# Patient Record
Sex: FEMALE | Race: OTHER | NOT HISPANIC OR LATINO
[De-identification: names, ages, dates, MRNs, and addresses within clinical notes are randomized per-mention and may not be internally consistent; named-entity substitution may affect disease eponyms.]

---

## 2022-11-17 PROBLEM — Z00.00 ENCOUNTER FOR PREVENTIVE HEALTH EXAMINATION: Status: ACTIVE | Noted: 2022-11-17

## 2022-11-18 PROBLEM — R92.2 DENSE BREAST: Status: ACTIVE | Noted: 2022-11-18

## 2022-11-22 ENCOUNTER — APPOINTMENT (OUTPATIENT)
Dept: SURGICAL ONCOLOGY | Facility: CLINIC | Age: 47
End: 2022-11-22

## 2022-11-22 VITALS
WEIGHT: 160 LBS | OXYGEN SATURATION: 98 % | RESPIRATION RATE: 18 BRPM | HEIGHT: 62.6 IN | SYSTOLIC BLOOD PRESSURE: 117 MMHG | BODY MASS INDEX: 28.71 KG/M2 | HEART RATE: 93 BPM | TEMPERATURE: 98 F | DIASTOLIC BLOOD PRESSURE: 80 MMHG

## 2022-11-22 DIAGNOSIS — Z78.9 OTHER SPECIFIED HEALTH STATUS: ICD-10-CM

## 2022-11-22 DIAGNOSIS — E78.5 HYPERLIPIDEMIA, UNSPECIFIED: ICD-10-CM

## 2022-11-22 DIAGNOSIS — I10 ESSENTIAL (PRIMARY) HYPERTENSION: ICD-10-CM

## 2022-11-22 DIAGNOSIS — R92.2 INCONCLUSIVE MAMMOGRAM: ICD-10-CM

## 2022-11-22 PROCEDURE — 99204 OFFICE O/P NEW MOD 45 MIN: CPT

## 2022-11-22 RX ORDER — OMEPRAZOLE 40 MG/1
40 CAPSULE, DELAYED RELEASE ORAL
Qty: 30 | Refills: 0 | Status: ACTIVE | COMMUNITY
Start: 2022-06-09

## 2022-11-22 RX ORDER — ROSUVASTATIN CALCIUM 40 MG/1
40 TABLET, FILM COATED ORAL
Qty: 30 | Refills: 0 | Status: ACTIVE | COMMUNITY
Start: 2022-11-13

## 2022-11-22 RX ORDER — ROSUVASTATIN CALCIUM 20 MG/1
20 TABLET, FILM COATED ORAL
Qty: 30 | Refills: 0 | Status: ACTIVE | COMMUNITY
Start: 2022-09-26

## 2022-11-22 RX ORDER — OLMESARTAN MEDOXOMIL 5 MG/1
5 TABLET, FILM COATED ORAL
Qty: 30 | Refills: 0 | Status: ACTIVE | COMMUNITY
Start: 2022-10-31

## 2022-11-22 NOTE — CONSULT LETTER
[Dear  ___] : Dear  [unfilled], [Consult Letter:] : I had the pleasure of evaluating your patient, [unfilled]. [Please see my note below.] : Please see my note below. [Consult Closing:] : Thank you very much for allowing me to participate in the care of this patient.  If you have any questions, please do not hesitate to contact me. [Sincerely,] : Sincerely, [FreeTextEntry3] : Jeannine Hoyos MD\par Breast Surgeon\par Division of Surgical Oncology\par Department of Surgery\par [address] [place] [address]\[place] [address] \par Tel: [phone]\par Fax: [phone]\par Email: [email or web address]  [DrJil  ___] : Dr. PA

## 2022-11-22 NOTE — PAST MEDICAL HISTORY
[Menstruating] : The patient is menstruating [Menarche Age ____] : age at menarche was [unfilled] [Definite ___ (Date)] : the last menstrual period was [unfilled] [Regular Cycle Intervals] : have been regular [Total Preg ___] : G[unfilled] [Live Births ___] : P[unfilled]  [Age At Live Birth ___] : Age at live birth: [unfilled] [History of Hormone Replacement Treatment] : has no history of hormone replacement treatment [FreeTextEntry6] : none [FreeTextEntry7] : 5 years [FreeTextEntry8] : 7.5 years

## 2022-11-22 NOTE — PHYSICAL EXAM
[Normocephalic] : normocephalic [Atraumatic] : atraumatic [EOMI] : extra ocular movement intact [PERRL] : pupils equal, round and reactive to light [Sclera nonicteric] : sclera nonicteric [Supple] : supple [No Supraclavicular Adenopathy] : no supraclavicular adenopathy [No Cervical Adenopathy] : no cervical adenopathy [No Thyromegaly] : no thyromegaly [Examined in the supine and seated position] : examined in the supine and seated position [Symmetrical] : symmetrical [Bra Size: ___] : Bra Size: [unfilled] [Grade 3] : Ptosis Grade 3 [No dominant masses] : no dominant masses in right breast  [No dominant masses] : no dominant masses left breast [No Nipple Retraction] : no left nipple retraction [No Nipple Discharge] : no left nipple discharge [No Axillary Lymphadenopathy] : no left axillary lymphadenopathy [No Edema] : no edema [No Rashes] : no rashes [No Ulceration] : no ulceration [de-identified] : non-labored respirations

## 2022-11-22 NOTE — HISTORY OF PRESENT ILLNESS
[FreeTextEntry1] : The patient is a 47 year F referred by Dr. Royer Ryan for consultation regarding L IDP, here for initial visit. \par \par She is accompanied by her son-in-law Madie Rogers who assisted with translation to [facility]. Daughter was present via telephone.\par \par The patient is recently immigrated here from [facility] (4/2022) and has not had any prior breast imaging until now. She denies any breast complaints prior to imaging and currently.\par \par Baseline imaging: \par 6/28/2022 B/L SM ([facility]) revealed heterogeneously dense breasts \par - L inner mid depth N6: asymmetry-> L DM and US\par - BR0\par \par They report that the next available appointment for diagnostic imaging was several months later.\par \par 10/17/2022 L DM ([facility])\par - L inner 0.6 x 0.4 cm nodular opacity\par - L axilla several prominent appearing LN visualized which may be reactive 2/2 COVID-19 vaccine administered in the left arm -> L axillary US\par - R neg\par - BR0\par \par 10/17/2022 L US\par - L 12:00 N5 0.6 x 0.5 x 0.9 cm group of cysts\par - L 4:00 N4 0.9 x 0.3 x 0.7 cm cyst\par - L 6:00 N3 0.3 x 0.2 x 0.5 cm hypoechoic nodule consistent w/ mildly complex cyst\par - L 7:00 N2 0.8 x 0.3 x 0.7 cm cyst\par - L 10:00 N5 0.5 x 0.4 x 0.4 cm lobulated hypoechoic complex appearing nodule-> USG-CNB\par - L axilla 0.4 cm LN w/ borderline cortical thickening\par - BR4\par \par 10/31/2022 L USG-CNB ([facility])\par - L 10:00 (R clip): IDP, concordant \par \par The patient has borderline DM, HLD, HTN\par She denies any prior surgeries\par She takes olmesartan, rosuvastatin, iron.\par She denies any family history of breast cancer or other cancers.

## 2022-11-22 NOTE — ASSESSMENT
[FreeTextEntry1] : The patient is a 47 year F referred by Dr. Royer Ryan for consultation regarding L IDP, here for initial visit. \par \par As these are high risk markers, excision is recommended. I discussed with the patient that papillary lesions are difficult for pathologist to read on needle biopsy and thus excision is always recommended, to allow histologic evaluation of the entire lesion. There is approximately 5 to 8% incidence of upgrading of these lesions, most commonly to either atypical hyperplasia or DCIS. If this is not upgraded, this is not a breast cancer risk factor and she should return to routine breast care. \par \par Preoperative, intraoperative, and postoperative considerations including preoperative localization by Radiology of this nonpalpable mass, intraoperative anesthetic management, and what she can expect in postoperative period were reviewed.\par  \par Risks, benefits, and alternatives to proposed surgical procedure were reviewed and all questions were answered to her satisfaction. \par \par Plan:\par - Screening R US now for dense breasts\par - L excisional biopsy w/ Magseed Localization (x1 site)\par - medical clearance needed

## 2022-12-14 ENCOUNTER — OUTPATIENT (OUTPATIENT)
Dept: OUTPATIENT SERVICES | Facility: HOSPITAL | Age: 47
LOS: 1 days | End: 2022-12-14

## 2022-12-14 VITALS
TEMPERATURE: 99 F | DIASTOLIC BLOOD PRESSURE: 80 MMHG | SYSTOLIC BLOOD PRESSURE: 119 MMHG | HEART RATE: 96 BPM | WEIGHT: 158.07 LBS | OXYGEN SATURATION: 97 % | RESPIRATION RATE: 16 BRPM | HEIGHT: 63 IN

## 2022-12-14 DIAGNOSIS — D24.9 BENIGN NEOPLASM OF UNSPECIFIED BREAST: ICD-10-CM

## 2022-12-14 DIAGNOSIS — D36.9 BENIGN NEOPLASM, UNSPECIFIED SITE: ICD-10-CM

## 2022-12-14 DIAGNOSIS — I10 ESSENTIAL (PRIMARY) HYPERTENSION: ICD-10-CM

## 2022-12-14 LAB
ALBUMIN SERPL ELPH-MCNC: 4.5 G/DL — SIGNIFICANT CHANGE UP (ref 3.3–5)
ALP SERPL-CCNC: 96 U/L — SIGNIFICANT CHANGE UP (ref 40–120)
ALT FLD-CCNC: 50 U/L — HIGH (ref 4–33)
ANION GAP SERPL CALC-SCNC: 12 MMOL/L — SIGNIFICANT CHANGE UP (ref 7–14)
AST SERPL-CCNC: 36 U/L — HIGH (ref 4–32)
BILIRUB SERPL-MCNC: 0.3 MG/DL — SIGNIFICANT CHANGE UP (ref 0.2–1.2)
BUN SERPL-MCNC: 6 MG/DL — LOW (ref 7–23)
CALCIUM SERPL-MCNC: 9.6 MG/DL — SIGNIFICANT CHANGE UP (ref 8.4–10.5)
CHLORIDE SERPL-SCNC: 104 MMOL/L — SIGNIFICANT CHANGE UP (ref 98–107)
CO2 SERPL-SCNC: 24 MMOL/L — SIGNIFICANT CHANGE UP (ref 22–31)
CREAT SERPL-MCNC: 0.66 MG/DL — SIGNIFICANT CHANGE UP (ref 0.5–1.3)
EGFR: 109 ML/MIN/1.73M2 — SIGNIFICANT CHANGE UP
GLUCOSE SERPL-MCNC: 123 MG/DL — HIGH (ref 70–99)
HCG SERPL-ACNC: <5 MIU/ML — SIGNIFICANT CHANGE UP
HCT VFR BLD CALC: 39.1 % — SIGNIFICANT CHANGE UP (ref 34.5–45)
HGB BLD-MCNC: 12.8 G/DL — SIGNIFICANT CHANGE UP (ref 11.5–15.5)
MCHC RBC-ENTMCNC: 29.4 PG — SIGNIFICANT CHANGE UP (ref 27–34)
MCHC RBC-ENTMCNC: 32.7 GM/DL — SIGNIFICANT CHANGE UP (ref 32–36)
MCV RBC AUTO: 89.7 FL — SIGNIFICANT CHANGE UP (ref 80–100)
NRBC # BLD: 0 /100 WBCS — SIGNIFICANT CHANGE UP (ref 0–0)
NRBC # FLD: 0 K/UL — SIGNIFICANT CHANGE UP (ref 0–0)
PLATELET # BLD AUTO: 393 K/UL — SIGNIFICANT CHANGE UP (ref 150–400)
POTASSIUM SERPL-MCNC: 3.5 MMOL/L — SIGNIFICANT CHANGE UP (ref 3.5–5.3)
POTASSIUM SERPL-SCNC: 3.5 MMOL/L — SIGNIFICANT CHANGE UP (ref 3.5–5.3)
PROT SERPL-MCNC: 7.6 G/DL — SIGNIFICANT CHANGE UP (ref 6–8.3)
RBC # BLD: 4.36 M/UL — SIGNIFICANT CHANGE UP (ref 3.8–5.2)
RBC # FLD: 12.8 % — SIGNIFICANT CHANGE UP (ref 10.3–14.5)
SODIUM SERPL-SCNC: 140 MMOL/L — SIGNIFICANT CHANGE UP (ref 135–145)
WBC # BLD: 8.48 K/UL — SIGNIFICANT CHANGE UP (ref 3.8–10.5)
WBC # FLD AUTO: 8.48 K/UL — SIGNIFICANT CHANGE UP (ref 3.8–10.5)

## 2022-12-14 PROCEDURE — 93010 ELECTROCARDIOGRAM REPORT: CPT

## 2022-12-14 NOTE — H&P PST ADULT - HISTORY OF PRESENT ILLNESS
Patient is female with preop dx of benign neoplasm unspecified . patient is scheduled for left breast excisional biopsy with magseed localization x 1 site.

## 2022-12-14 NOTE — H&P PST ADULT - PROBLEM SELECTOR PLAN 1
Patient tentatively scheduled for breast excisional biopsy with Magseed localization x 1 site on 12/30/22    Pre-op instructions provided. Pt given verbal and written instructions with teach back on chlorhexidine shampoo and Pepcid. Pt verbalized understanding with return demonstration.    Pt has a scheduled preop COVID test.

## 2022-12-14 NOTE — H&P PST ADULT - NEGATIVE OPHTHALMOLOGIC SYMPTOMS
no diplopia/no photophobia/no lacrimation L/no lacrimation R/no blurred vision L/no blurred vision R/no discharge L/no discharge R/no pain L/no loss of vision L/no loss of vision R/no scleral injection L/no scleral injection R

## 2022-12-14 NOTE — H&P PST ADULT - NSICDXFAMILYHX_GEN_ALL_CORE_FT
FAMILY HISTORY:  Mother  Still living? Yes, Estimated age: Age Unknown  FH: diabetes mellitus, Age at diagnosis: Age Unknown  FH: diabetes mellitus, Age at diagnosis: Age Unknown

## 2022-12-20 PROBLEM — D36.9 BENIGN NEOPLASM, UNSPECIFIED SITE: Chronic | Status: ACTIVE | Noted: 2022-12-14

## 2022-12-20 PROBLEM — E78.5 HYPERLIPIDEMIA, UNSPECIFIED: Chronic | Status: ACTIVE | Noted: 2022-12-14

## 2022-12-20 PROBLEM — I10 ESSENTIAL (PRIMARY) HYPERTENSION: Chronic | Status: ACTIVE | Noted: 2022-12-14

## 2022-12-21 ENCOUNTER — RESULT REVIEW (OUTPATIENT)
Age: 47
End: 2022-12-21

## 2022-12-22 ENCOUNTER — OUTPATIENT (OUTPATIENT)
Dept: OUTPATIENT SERVICES | Facility: HOSPITAL | Age: 47
LOS: 1 days | End: 2022-12-22
Payer: MEDICAID

## 2022-12-22 DIAGNOSIS — C80.1 MALIGNANT (PRIMARY) NEOPLASM, UNSPECIFIED: ICD-10-CM

## 2022-12-22 LAB — SURGICAL PATHOLOGY STUDY: SIGNIFICANT CHANGE UP

## 2022-12-22 PROCEDURE — 88321 CONSLTJ&REPRT SLD PREP ELSWR: CPT

## 2022-12-23 ENCOUNTER — APPOINTMENT (OUTPATIENT)
Dept: ULTRASOUND IMAGING | Facility: IMAGING CENTER | Age: 47
End: 2022-12-23

## 2022-12-23 ENCOUNTER — OUTPATIENT (OUTPATIENT)
Dept: OUTPATIENT SERVICES | Facility: HOSPITAL | Age: 47
LOS: 1 days | End: 2022-12-23
Payer: MEDICAID

## 2022-12-23 ENCOUNTER — RESULT REVIEW (OUTPATIENT)
Age: 47
End: 2022-12-23

## 2022-12-23 DIAGNOSIS — Z00.8 ENCOUNTER FOR OTHER GENERAL EXAMINATION: ICD-10-CM

## 2022-12-23 PROCEDURE — 19285 PERQ DEV BREAST 1ST US IMAG: CPT | Mod: LT

## 2022-12-23 PROCEDURE — 19285 PERQ DEV BREAST 1ST US IMAG: CPT

## 2022-12-29 LAB — SARS-COV-2 RNA SPEC QL NAA+PROBE: DETECTED

## 2022-12-30 ENCOUNTER — APPOINTMENT (OUTPATIENT)
Dept: SURGICAL ONCOLOGY | Facility: AMBULATORY SURGERY CENTER | Age: 47
End: 2022-12-30

## 2022-12-30 ENCOUNTER — APPOINTMENT (OUTPATIENT)
Dept: MAMMOGRAPHY | Facility: IMAGING CENTER | Age: 47
End: 2022-12-30

## 2023-01-12 ENCOUNTER — OUTPATIENT (OUTPATIENT)
Dept: OUTPATIENT SERVICES | Facility: HOSPITAL | Age: 48
LOS: 1 days | End: 2023-01-12

## 2023-01-12 VITALS
OXYGEN SATURATION: 98 % | WEIGHT: 156.09 LBS | DIASTOLIC BLOOD PRESSURE: 74 MMHG | TEMPERATURE: 99 F | RESPIRATION RATE: 16 BRPM | HEIGHT: 62 IN | SYSTOLIC BLOOD PRESSURE: 117 MMHG | HEART RATE: 94 BPM

## 2023-01-12 DIAGNOSIS — D36.9 BENIGN NEOPLASM, UNSPECIFIED SITE: ICD-10-CM

## 2023-01-12 DIAGNOSIS — Z78.9 OTHER SPECIFIED HEALTH STATUS: ICD-10-CM

## 2023-01-12 DIAGNOSIS — Z98.890 OTHER SPECIFIED POSTPROCEDURAL STATES: Chronic | ICD-10-CM

## 2023-01-12 LAB — HCG SERPL-ACNC: <5 MIU/ML — SIGNIFICANT CHANGE UP

## 2023-01-12 NOTE — H&P PST ADULT - HISTORY OF PRESENT ILLNESS
46 y/o F with H/O:   mammo/ US showed something  left breast pain last year 48 y/o F [place] speaking with H/O: HTN, HLD presents to PST with h/o abnormal mammo/ US. Pt c/o left breast pain last year. Schedule for left excisional biopsy with MagSeed localization x1 site.

## 2023-01-12 NOTE — H&P PST ADULT - NSICDXPASTSURGICALHX_GEN_ALL_CORE_FT
PAST SURGICAL HISTORY:  No significant past surgical history      PAST SURGICAL HISTORY:  S/P breast biopsy

## 2023-01-12 NOTE — H&P PST ADULT - PROBLEM SELECTOR PLAN 1
Schedule for left excisional biopsy with Magseed localization x 1 site on 01/27/2023. Pre op instructions given and explained. Pt verbalized understanding.  AS per pt, surgery was scheduled on 12/30/22 but was cancelled due to+ Covid19 PCR on 12/28/22.

## 2023-01-12 NOTE — H&P PST ADULT - NEGATIVE ENMT SYMPTOMS
no hearing difficulty/no ear pain/no tinnitus/no nasal congestion/no nasal obstruction/no nose bleeds/no abnormal taste sensation/no dry mouth/no throat pain

## 2023-01-12 NOTE — H&P PST ADULT - NEGATIVE RESPIRATORY AND THORAX SYMPTOMS
no wheezing/no dyspnea/no hemoptysis/no pleuritic chest pain no wheezing/no dyspnea/no cough/no hemoptysis/no pleuritic chest pain

## 2023-01-12 NOTE — H&P PST ADULT - CARDIOVASCULAR COMMENTS
walk 1 hour daily weather permitted , does own house chores walk 1 hour daily weather permitted, does own house chores

## 2023-01-26 ENCOUNTER — TRANSCRIPTION ENCOUNTER (OUTPATIENT)
Age: 48
End: 2023-01-26

## 2023-01-27 ENCOUNTER — RESULT REVIEW (OUTPATIENT)
Age: 48
End: 2023-01-27

## 2023-01-27 ENCOUNTER — APPOINTMENT (OUTPATIENT)
Dept: MAMMOGRAPHY | Facility: IMAGING CENTER | Age: 48
End: 2023-01-27

## 2023-01-27 ENCOUNTER — OUTPATIENT (OUTPATIENT)
Dept: OUTPATIENT SERVICES | Facility: HOSPITAL | Age: 48
LOS: 1 days | End: 2023-01-27
Payer: COMMERCIAL

## 2023-01-27 ENCOUNTER — OUTPATIENT (OUTPATIENT)
Dept: OUTPATIENT SERVICES | Facility: HOSPITAL | Age: 48
LOS: 1 days | Discharge: HOME | End: 2023-01-27
Payer: MEDICAID

## 2023-01-27 ENCOUNTER — TRANSCRIPTION ENCOUNTER (OUTPATIENT)
Age: 48
End: 2023-01-27

## 2023-01-27 ENCOUNTER — APPOINTMENT (OUTPATIENT)
Dept: SURGICAL ONCOLOGY | Facility: AMBULATORY SURGERY CENTER | Age: 48
End: 2023-01-27

## 2023-01-27 VITALS
HEIGHT: 62 IN | RESPIRATION RATE: 16 BRPM | WEIGHT: 156.09 LBS | SYSTOLIC BLOOD PRESSURE: 122 MMHG | HEART RATE: 80 BPM | DIASTOLIC BLOOD PRESSURE: 70 MMHG | TEMPERATURE: 99 F | OXYGEN SATURATION: 99 %

## 2023-01-27 VITALS
SYSTOLIC BLOOD PRESSURE: 112 MMHG | OXYGEN SATURATION: 99 % | RESPIRATION RATE: 14 BRPM | TEMPERATURE: 98 F | HEART RATE: 68 BPM | DIASTOLIC BLOOD PRESSURE: 62 MMHG

## 2023-01-27 DIAGNOSIS — D36.9 BENIGN NEOPLASM, UNSPECIFIED SITE: ICD-10-CM

## 2023-01-27 DIAGNOSIS — Z98.890 OTHER SPECIFIED POSTPROCEDURAL STATES: Chronic | ICD-10-CM

## 2023-01-27 DIAGNOSIS — Z00.8 ENCOUNTER FOR OTHER GENERAL EXAMINATION: ICD-10-CM

## 2023-01-27 PROCEDURE — 76098 X-RAY EXAM SURGICAL SPECIMEN: CPT | Mod: 26

## 2023-01-27 PROCEDURE — 88307 TISSUE EXAM BY PATHOLOGIST: CPT | Mod: 26

## 2023-01-27 PROCEDURE — 76098 X-RAY EXAM SURGICAL SPECIMEN: CPT

## 2023-01-27 PROCEDURE — 19125 EXCISION BREAST LESION: CPT

## 2023-01-27 RX ORDER — OLMESARTAN MEDOXOMIL 5 MG/1
1 TABLET, FILM COATED ORAL
Qty: 0 | Refills: 0 | DISCHARGE

## 2023-01-27 RX ORDER — ROSUVASTATIN CALCIUM 5 MG/1
1 TABLET ORAL
Qty: 0 | Refills: 0 | DISCHARGE

## 2023-01-27 NOTE — ASU DISCHARGE PLAN (ADULT/PEDIATRIC) - CALL YOUR DOCTOR IF YOU HAVE ANY OF THE FOLLOWING:
Bleeding that does not stop/Swelling that gets worse/Pain not relieved by Medications/Wound/Surgical Site with redness, or foul smelling discharge or pus Bleeding that does not stop/Swelling that gets worse/Pain not relieved by Medications/Fever greater than (need to indicate Fahrenheit or Celsius)/Wound/Surgical Site with redness, or foul smelling discharge or pus/Nausea and vomiting that does not stop

## 2023-01-27 NOTE — ASU DISCHARGE PLAN (ADULT/PEDIATRIC) - NS MD DC FALL RISK RISK
For information on Fall & Injury Prevention, visit: [email or web address] OR  [email or web address] OR  https://www.cdc.gov/steadi/patient.html

## 2023-01-27 NOTE — ASU DISCHARGE PLAN (ADULT/PEDIATRIC) - CARE PROVIDER_API CALL
Jeannine Hoyos)  Surgery  [address] [place] [id number]  Phone: [phone]  Fax: [phone]  Follow Up Time: 2 weeks

## 2023-01-27 NOTE — ASU PREOPERATIVE ASSESSMENT, ADULT (IPARK ONLY) - FALL HARM RISK - PT AGE POPULATION HIDDEN
Adult Niacinamide Pregnancy And Lactation Text: These medications are considered safe during pregnancy.

## 2023-01-27 NOTE — ASU DISCHARGE PLAN (ADULT/PEDIATRIC) - ASU DC SPECIAL INSTRUCTIONSFT
[facility]  CANCER  I  N  S  T  I  T  U  T E     Department of Surgery  Division of Surgical Oncology    Theron Ramirez M.D., EDE.CARLITA.  Chief, Division of Surgical Oncology    Ming Velez M.D., F.A.C.S., F.A.S.JESUS.  Associate , Department of Surgery    Yannick Arriola M.D., F.A.C.S.  Ronak Montiel M.D., F.A.C.S.  Dawood Li M.D., F.A.C.S.  Levon Boucher M.D., F.A.C.S.  Ronak Lott M.D., F.A.C.S.  Arturo Kendall M.D., F.LATOYAC.S.      Breast Biopsy/Lumpectomy Post-operative Instructions  1.	Supportive bra- Please bring a sports/athletic bra with you on the day of your surgery.  You will wear it home from the hospital.  You should wear the supportive bra continuously for 48 hours after the procedure, including wearing it to sleep.  Therefore, you may wear your regular bra.  You may remove the bra to shower.  The sports bra will provide support, decrease the amount of swelling at the biopsy site, and make your recovery more comfortable.    2.	Wound dressing- There is a dressing on the wound, which consists of 2 layers.  The outer layer is a clear plastic dressing (called Tegaderm) which is waterproof.  This should remain in place for 2 days post-operatively.  On the 2nd post-operative day, you should remove the clear plastic Tegaderm dressing.  Underneath the Tegaderm dressing, there are white surgical tapes (called steri strips) which are directly adherent to the skin.  These should remain on the skin, and will peel off naturally.  All of the stitches are “internal” and will dissolve naturally.    3.	Showering/Bathing- You may shower over the dressing the very next day after surgery.  Allow the water to run over the dressing, but don not scrub the area.  It is best not to sit in a bathtub or swimming pool for at least one week after surgery.    4.	Activity level- You may resume most normal daily activity as tolerated, but avoid strenuous activities such as aerobics, jogging, exercising or heavy lifting for at least 1 week after surgery.  You may return to work in 1-2 days after surgery.  You may drive as long as you are not taking any prescription pain medication.    5.	Pain Medication- You may take the prescribed medication, or you may take extra-strength Tylenol as needed.  Please don't take aspirin, Motrin, Advil or any other anti-inflammatory medications, as these medications may cause bleeding or bruising.    6.	Follow-up Appointment- Please call the office to schedule your post-operative appointment which should be approximately 10 days after your surgery. Office [phone]    7.	Bruising/Bleeding/Swelling- It is normal for there to be some bruising and swelling at the breast biopsy site, and there may be some staining of blood on to the dressing.  Some discomfort at the surgical site is expected.  If your symptoms seem excessive, or if you have any question or concerns, please call the office.      Anesthesia Precautions:  For the next 12 hours do not:   •	drive a car,  •	drink alcohol, beer, or wine,   •	make important personal or business decisions  Diet:   •	Progress diet slowly unless otherwise indicated    Physician Notification  •	Any Prescription issues  •	Bleeding that does not stop  •	Persistent nausea and vomiting  •	Pain not relieved by medications  •	Fever greater than 101®F  •	Inability to tolerate liquids or foods  •	Unable to urinate after 8 hours  Discharge and Disposition  •	Discharge to home/ group home/assisted living  •	Accompanied by Family/Spouse/ Parents/ Significant Other/ Friend/ and or Caregiver    Follow Up Care:  •	In the event that you develop a complication and you are unable to reach your own physician, you may contact:  [phone] or go to the nearest Emergency Room.   •	Please call your surgeon to schedule your follow up appointment [phone] [facility]  CANCER  I  N  S  T  I  T  U  T E     Department of Surgery  Division of Surgical Oncology    Theron Ramirez M.D., EDE.CARLITA.  Chief, Division of Surgical Oncology    Ming Velez M.D., F.A.C.S., F.A.S.JESUS.  Associate , Department of Surgery    Yannick Arriola M.D., F.A.C.S.  Ronak Montiel M.D., F.A.C.S.  Dawood Li M.D., F.A.C.S.  Levon Boucher M.D., F.A.C.S.  Ronak Lott M.D., F.A.C.S.  Arturo Kendall M.D., F.LATOYAC.S.      Breast Biopsy/Lumpectomy Post-operative Instructions  1.	Supportive bra- Please bring a sports/athletic bra with you on the day of your surgery.  You will wear it home from the hospital.  You should wear the supportive bra continuously for 48 hours after the procedure, including wearing it to sleep.  Therefore, you may wear your regular bra.  You may remove the bra to shower.  The sports bra will provide support, decrease the amount of swelling at the biopsy site, and make your recovery more comfortable.    2.	Wound dressing- There is a dressing on the wound, which consists of 2 layers.  The outer layer is a clear plastic dressing (called Tegaderm) which is waterproof.  This should remain in place for 2 days post-operatively.  On the 2nd post-operative day, you should remove the clear plastic Tegaderm dressing.  Underneath the Tegaderm dressing, there are white surgical tapes (called steri strips) which are directly adherent to the skin.  These should remain on the skin, and will peel off naturally.  All of the stitches are “internal” and will dissolve naturally.    3.	Showering/Bathing- You may shower over the dressing the very next day after surgery.  Allow the water to run over the dressing, but don not scrub the area.  It is best not to sit in a bathtub or swimming pool for at least one week after surgery.    4.	Activity level- You may resume most normal daily activity as tolerated, but avoid strenuous activities such as aerobics, jogging, exercising or heavy lifting for at least 1 week after surgery.  You may return to work in 1-2 days after surgery.  You may drive as long as you are not taking any prescription pain medication.    5.	Pain Medication- You may take the prescribed medication, or you may take extra-strength Tylenol as needed.      6.	Follow-up Appointment- Please call the office to schedule your post-operative appointment which should be approximately 10 days after your surgery. Office [phone]    7.	Bruising/Bleeding/Swelling- It is normal for there to be some bruising and swelling at the breast biopsy site, and there may be some staining of blood on to the dressing.  Some discomfort at the surgical site is expected.  If your symptoms seem excessive, or if you have any question or concerns, please call the office.      Anesthesia Precautions:  For the next 12 hours do not:   •	drive a car,  •	drink alcohol, beer, or wine,   •	make important personal or business decisions  Diet:   •	Progress diet slowly unless otherwise indicated    Physician Notification  •	Any Prescription issues  •	Bleeding that does not stop  •	Persistent nausea and vomiting  •	Pain not relieved by medications  •	Fever greater than 101®F  •	Inability to tolerate liquids or foods  •	Unable to urinate after 8 hours  Discharge and Disposition  •	Discharge to home/ group home/assisted living  •	Accompanied by Family/Spouse/ Parents/ Significant Other/ Friend/ and or Caregiver    Follow Up Care:  •	In the event that you develop a complication and you are unable to reach your own physician, you may contact:  [phone] or go to the nearest Emergency Room.   •	Please call your surgeon to schedule your follow up appointment [phone]

## 2023-01-27 NOTE — ASU PREOPERATIVE ASSESSMENT, ADULT (IPARK ONLY) - FALL HARM RISK - UNIVERSAL INTERVENTIONS
Bed in lowest position, wheels locked, appropriate side rails in place/Call bell, personal items and telephone in reach/Instruct patient to call for assistance before getting out of bed or chair/Non-slip footwear when patient is out of bed/[place] to call system/Physically safe environment - no spills, clutter or unnecessary equipment/Purposeful Proactive Rounding/Room/bathroom lighting operational, light cord in reach

## 2023-01-27 NOTE — ASU DISCHARGE PLAN (ADULT/PEDIATRIC) - NURSING INSTRUCTIONS
DO NOT take any Tylenol (Acetaminophen) or narcotics containing Tylenol until after 07:30pm. You received Tylenol during your operation and it can cause damage to your liver if too much is taken within a 24 hour time period.

## 2023-01-31 LAB — SURGICAL PATHOLOGY STUDY: SIGNIFICANT CHANGE UP

## 2023-02-01 PROBLEM — D36.9 INTRADUCTAL PAPILLOMA: Status: ACTIVE | Noted: 2022-11-18

## 2023-02-08 ENCOUNTER — APPOINTMENT (OUTPATIENT)
Dept: SURGICAL ONCOLOGY | Facility: CLINIC | Age: 48
End: 2023-02-08
Payer: MEDICAID

## 2023-02-08 VITALS
WEIGHT: 160 LBS | RESPIRATION RATE: 16 BRPM | HEIGHT: 62 IN | HEART RATE: 112 BPM | OXYGEN SATURATION: 99 % | SYSTOLIC BLOOD PRESSURE: 111 MMHG | BODY MASS INDEX: 29.44 KG/M2 | DIASTOLIC BLOOD PRESSURE: 78 MMHG

## 2023-02-08 DIAGNOSIS — D36.9 BENIGN NEOPLASM, UNSPECIFIED SITE: ICD-10-CM

## 2023-02-08 PROCEDURE — 99024 POSTOP FOLLOW-UP VISIT: CPT

## 2023-02-08 NOTE — ASSESSMENT
[FreeTextEntry1] : The patient is a 47 year F referred by Dr. Royer Ryan for consultation regarding L IDP, s/p L excisional biopsy on 1/27/2023.\par \par 1/27/2023 L excisional biopsy\par - L IDP, fibrocystic changes, columnar cell changes \par \par Exam today shows a well-healing incision without evidence of hematoma, seroma, or infection.\par \par Plan:\par - Next B/L SM and BL US 6/2023\par - follow-up with GYN and PCP for routine breast exams\par - RTO PRN  No

## 2023-02-08 NOTE — PHYSICAL EXAM
[Normocephalic] : normocephalic [Atraumatic] : atraumatic [EOMI] : extra ocular movement intact [PERRL] : pupils equal, round and reactive to light [Sclera nonicteric] : sclera nonicteric [Symmetrical] : symmetrical [Bra Size: ___] : Bra Size: [unfilled] [Grade 3] : Ptosis Grade 3 [No dominant masses] : no dominant masses in right breast  [No dominant masses] : no dominant masses left breast [No Nipple Retraction] : no left nipple retraction [No Nipple Discharge] : no left nipple discharge [No Axillary Lymphadenopathy] : no left axillary lymphadenopathy [No Edema] : no edema [No Rashes] : no rashes [No Ulceration] : no ulceration [de-identified] : non-labored respirations  [de-identified] : superior circumareolar incision healing well, no erythema, no fluctuance, no ecchymosis

## 2023-02-08 NOTE — CONSULT LETTER
[Dear  ___] : Dear  [unfilled], [Courtesy Letter:] : I had the pleasure of seeing your patient, [unfilled], in my office today. [Please see my note below.] : Please see my note below. [Consult Closing:] : Thank you very much for allowing me to participate in the care of this patient.  If you have any questions, please do not hesitate to contact me. [Sincerely,] : Sincerely, [DrJil  ___] : Dr. PA [FreeTextEntry3] : Jeannine Hoyos MD\par Breast Surgeon\par Division of Surgical Oncology\par Department of Surgery\par [address] [place] [address]\[place] [address] \par Tel: [phone]\par Fax: [phone]\par Email: [email or web address]

## 2023-02-08 NOTE — HISTORY OF PRESENT ILLNESS
[FreeTextEntry1] : The patient is a 47 year F referred by Dr. Royer Ryan for consultation regarding L IDP s/p L excisional biopsy on 1/27/2023 here for a postop visit.\par \par She is accompanied by her son-in-law Madie Rogers who assisted with translation to [facility]. Daughter was present via telephone.\par \par Prior History:\par The patient is recently immigrated here from [facility] (4/2022) and has not had any prior breast imaging until now. She denies any breast complaints prior to imaging and currently.\par \par Baseline imaging: \par 6/28/2022 B/L SM ([facility]) revealed heterogeneously dense breasts \par - L inner mid depth N6: asymmetry-> L DM and US\par - BR0\par \par They report that the next available appointment for diagnostic imaging was several months later.\par \par 10/17/2022 L DM ([facility])\par - L inner 0.6 x 0.4 cm nodular opacity\par - L axilla several prominent appearing LN visualized which may be reactive 2/2 COVID-19 vaccine administered in the left arm -> L axillary US\par - R neg\par - BR0\par \par 10/17/2022 L US\par - L 12:00 N5 0.6 x 0.5 x 0.9 cm group of cysts\par - L 4:00 N4 0.9 x 0.3 x 0.7 cm cyst\par - L 6:00 N3 0.3 x 0.2 x 0.5 cm hypoechoic nodule consistent w/ mildly complex cyst\par - L 7:00 N2 0.8 x 0.3 x 0.7 cm cyst\par - L 10:00 N5 0.5 x 0.4 x 0.4 cm lobulated hypoechoic complex appearing nodule-> USG-CNB\par - L axilla 0.4 cm LN w/ borderline cortical thickening\par - BR4\par \par 10/31/2022 L USG-CNB ([facility])\par - L 10:00 (R clip): IDP, concordant \par \par The patient has borderline DM, HLD, HTN\par She denies any prior surgeries\par She takes olmesartan, rosuvastatin, iron.\par She denies any family history of breast cancer or other cancers.\par \par 12/5/2022 B/L US ([facility])\par - R 12:00 N3: 3 x 2 x 4 mm cyst\par - R 11:00 N3: 5 x 3 x 3 mm cyst\par - R 2:00 N5: 4 x 2 x 4 mm cyst\par - R 7:00 N6: 6 x 2 x 5 mm cyst\par - R 7:00 N6: 7 x 3 x 5 mm cluster of cysts\par - R 9:00 N4: 1.2 x 0.6 x 1.2 cm cluster of cysts\par - R 10:00 N3: 7 x 3 x 8 mm complicated cyst\par - L 12:00 N5: 9 x 4 x 5 mm two adjacent complicated cysts\par - L 4:00 N4: 11 x 3 x 7 mm complicated cysts\par - L 6:00 N3: 4 x 2 x 4 mm complicated cyst\par - L 7:00 N2: 6 x 3 x 5 mm cyst\par - L 10:00 N5: 10 x 5 x 7 mm previously biopsied mass increased in size-> surgical excision\par -BR4\par \par 1/27/2023 L excisional biopsy\par - L IDP, fibrocystic and columnar cell changes \par \par Interval History (postop):\par The patient reports minimal pain, but notes occasional "burning" and itching in the area. She massages and uses ice packs, takes Tylenol as needed. Denies any redness or drainage. No fevers/chills.

## 2023-10-24 NOTE — H&P PST ADULT - LANGUAGE ASSISTANCE NEEDED
Detail Level: Simple
Bpo Recommendations: PanOxyl 10% wash
No-Patient/Caregiver offered and refused free interpretation services.

## (undated) DEVICE — SUT VICRYL 3-0 27" SH UNDYED

## (undated) DEVICE — RADIOGRAPHY DVC SPEC TRANSPEC

## (undated) DEVICE — VENODYNE/SCD SLEEVE CALF MEDIUM

## (undated) DEVICE — GOWN LG

## (undated) DEVICE — WARMING BLANKET LOWER ADULT

## (undated) DEVICE — SOL IRR POUR NS 0.9% 500ML

## (undated) DEVICE — GLV 7.5 PROTEXIS (WHITE)

## (undated) DEVICE — GLV 7 PROTEXIS (WHITE)

## (undated) DEVICE — DRSG STERISTRIPS 0.5 X 4"

## (undated) DEVICE — ELCTR GROUNDING PAD ADULT COVIDIEN

## (undated) DEVICE — DRAPE CHEST BREAST 106" X 122"

## (undated) DEVICE — ELCTR BOVIE TIP BLADE INSULATED 2.75" EDGE

## (undated) DEVICE — SUT MONOCRYL 4-0 27" PS-2 UNDYED

## (undated) DEVICE — SUT SILK 2-0 30" SH

## (undated) DEVICE — ELCTR ROCKER SWITCH PENCIL BLUE 10FT

## (undated) DEVICE — POSITIONER STRAP ARMBOARD VELCRO TS-30